# Patient Record
Sex: FEMALE | Race: WHITE | NOT HISPANIC OR LATINO
[De-identification: names, ages, dates, MRNs, and addresses within clinical notes are randomized per-mention and may not be internally consistent; named-entity substitution may affect disease eponyms.]

---

## 2018-11-02 ENCOUNTER — TRANSCRIPTION ENCOUNTER (OUTPATIENT)
Age: 58
End: 2018-11-02

## 2018-11-15 ENCOUNTER — TRANSCRIPTION ENCOUNTER (OUTPATIENT)
Age: 58
End: 2018-11-15

## 2020-02-12 ENCOUNTER — TRANSCRIPTION ENCOUNTER (OUTPATIENT)
Age: 60
End: 2020-02-12

## 2021-04-29 ENCOUNTER — TRANSCRIPTION ENCOUNTER (OUTPATIENT)
Age: 61
End: 2021-04-29

## 2021-07-09 ENCOUNTER — APPOINTMENT (OUTPATIENT)
Dept: OTOLARYNGOLOGY | Facility: CLINIC | Age: 61
End: 2021-07-09
Payer: COMMERCIAL

## 2021-07-09 VITALS
HEIGHT: 66 IN | HEART RATE: 74 BPM | TEMPERATURE: 97.2 F | BODY MASS INDEX: 20.89 KG/M2 | WEIGHT: 130 LBS | SYSTOLIC BLOOD PRESSURE: 100 MMHG | DIASTOLIC BLOOD PRESSURE: 73 MMHG

## 2021-07-09 DIAGNOSIS — H92.01 OTALGIA, RIGHT EAR: ICD-10-CM

## 2021-07-09 PROCEDURE — 92567 TYMPANOMETRY: CPT

## 2021-07-09 PROCEDURE — 92557 COMPREHENSIVE HEARING TEST: CPT

## 2021-07-09 PROCEDURE — 99203 OFFICE O/P NEW LOW 30 MIN: CPT

## 2021-07-09 PROCEDURE — 99072 ADDL SUPL MATRL&STAF TM PHE: CPT

## 2021-07-09 RX ORDER — THYROID, PORCINE 60 MG/1
60 TABLET ORAL
Refills: 0 | Status: ACTIVE | COMMUNITY

## 2021-07-09 NOTE — DATA REVIEWED
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

## 2021-07-09 NOTE — REVIEW OF SYSTEMS
[Ear Noises] : ear noises [Throat Clearing] : throat clearing [Negative] : Heme/Lymph [Patient Intake Form Reviewed] : Patient intake form was reviewed

## 2021-07-09 NOTE — ASSESSMENT
[FreeTextEntry1] : exam unremarkable\par audio au sn loss 6000 8000 hz\par a/a\par rt tmj  rec dental eval\par eust tube dys\par trial sudafed

## 2021-07-09 NOTE — HISTORY OF PRESENT ILLNESS
[de-identified] : co 4 weeks pressure ad\par mild seasonal allergies\par using allegra and zyrtec\par question hearing loss\par neg pmh re ears

## 2021-07-26 ENCOUNTER — TRANSCRIPTION ENCOUNTER (OUTPATIENT)
Age: 61
End: 2021-07-26

## 2022-06-09 ENCOUNTER — NON-APPOINTMENT (OUTPATIENT)
Age: 62
End: 2022-06-09

## 2022-09-23 ENCOUNTER — APPOINTMENT (OUTPATIENT)
Dept: OTOLARYNGOLOGY | Facility: CLINIC | Age: 62
End: 2022-09-23
Payer: COMMERCIAL

## 2022-09-23 VITALS — TEMPERATURE: 97.3 F | WEIGHT: 128 LBS | BODY MASS INDEX: 20.57 KG/M2 | HEIGHT: 66 IN

## 2022-09-23 DIAGNOSIS — H90.3 SENSORINEURAL HEARING LOSS, BILATERAL: ICD-10-CM

## 2022-09-23 DIAGNOSIS — R05.3 CHRONIC COUGH: ICD-10-CM

## 2022-09-23 DIAGNOSIS — H69.81 OTHER SPECIFIED DISORDERS OF EUSTACHIAN TUBE, RIGHT EAR: ICD-10-CM

## 2022-09-23 DIAGNOSIS — J04.10 ACUTE TRACHEITIS W/OUT OBSTRUCTION: ICD-10-CM

## 2022-09-23 PROCEDURE — 99213 OFFICE O/P EST LOW 20 MIN: CPT | Mod: 25

## 2022-09-23 PROCEDURE — 31575 DIAGNOSTIC LARYNGOSCOPY: CPT

## 2022-09-23 RX ORDER — PREDNISONE 10 MG/1
10 TABLET ORAL TWICE DAILY
Qty: 15 | Refills: 2 | Status: ACTIVE | COMMUNITY
Start: 2022-09-23 | End: 1900-01-01

## 2022-09-23 NOTE — ASSESSMENT
[FreeTextEntry1] : tracheitis and cough\par chronic eust tube dys\par allergic rhinitis\par pred 10 #15\par fu audio one y\par More than 20 minutes spent with patient with exam and counseling.\par

## 2022-09-23 NOTE — REVIEW OF SYSTEMS
[Throat Dryness] : throat dryness [Throat Itching] : throat itching [Negative] : Heme/Lymph [Patient Intake Form Reviewed] : Patient intake form was reviewed [de-identified] : ears

## 2022-09-23 NOTE — PROCEDURE
[de-identified] : Indication for procedure:Unable to examine laryngeal structures with mirror exam.  Difficulty w persistent throat discomfort and excessive\par throat clearing\par The patient has nonproductive cough\par \par Scope # 47\par Topical anesthesia with viscous xylocaine 2% is applied to the anterior nares.\par A flexible fiberoptic laryngoscope is than introduced through the nares.\par The nasopharynx is clear without mass or inflammation.\par The posterior pharyngeal wall is unremarkable.\par The tongue base and vallecula are unremarkable.  The hypopharynx is unremarkable and unobstructed.\par The supraglottic larynx is within normal limits.\par Both vocal cords are fully mobile with no nodule, polyp or other lesion present.\par There is no edema or erythema overlying the arytenoid cartilages or the inter-arytenoid space.\par The subglottic space is clear.\par The voice has a normal quality.\par

## 2022-09-23 NOTE — HISTORY OF PRESENT ILLNESS
[de-identified] : co ear plugging past  month\par hx inhalant allergies\par using zyrtec and allegra not helping\par co cough nonproductive and tickle throat past 4 weeks\par  no reflux\par high frequency loss